# Patient Record
Sex: FEMALE | Race: WHITE | Employment: FULL TIME | URBAN - METROPOLITAN AREA
[De-identification: names, ages, dates, MRNs, and addresses within clinical notes are randomized per-mention and may not be internally consistent; named-entity substitution may affect disease eponyms.]

---

## 2020-10-10 ENCOUNTER — HOSPITAL ENCOUNTER (OUTPATIENT)
Age: 55
Discharge: ANOTHER HOSPITAL | End: 2020-10-10
Payer: COMMERCIAL

## 2020-10-10 VITALS
SYSTOLIC BLOOD PRESSURE: 159 MMHG | WEIGHT: 280 LBS | TEMPERATURE: 98 F | RESPIRATION RATE: 20 BRPM | OXYGEN SATURATION: 99 % | HEART RATE: 110 BPM | DIASTOLIC BLOOD PRESSURE: 79 MMHG

## 2020-10-10 DIAGNOSIS — R10.33 ABDOMINAL PAIN, PERIUMBILICAL: Primary | ICD-10-CM

## 2020-10-10 PROCEDURE — 99203 OFFICE O/P NEW LOW 30 MIN: CPT | Performed by: NURSE PRACTITIONER

## 2020-10-10 NOTE — ED INITIAL ASSESSMENT (HPI)
Pt having abdominal pain all day, and noticed a hernia to umbilicus. No fever/vomiting. Reports loose stool last night, no rash. Pt is visibly in pain, Having difficulty sitting down and having \"waves\" of pain. Painful to palpate umbilical area.  Pt immed

## 2020-10-10 NOTE — ED PROVIDER NOTES
Patient Seen in: [facility]    History   CC: abd pain  HPI: Donya Rader 50year old female  who presents c/o periumbilical abdominal pain which first began at approximately 1:00 AM however is been progressively worseni warm and dry throughout, mmm, no obvious signs of swelling/trauma/deformity, cap refill <2seconds  Neuro - A&O x4, steady gait  MSK - makes purposeful movements of all extremities, radial pulses 2+ bilat.   Psych - Interactive and appropriate      ED Course

## 2020-10-20 PROBLEM — D12.6 TUBULAR ADENOMA OF COLON: Status: ACTIVE | Noted: 2018-10-25

## 2020-10-20 PROBLEM — R59.0 LYMPHADENOPATHY OF LEFT CERVICAL REGION: Status: ACTIVE | Noted: 2019-05-06

## 2020-10-20 PROBLEM — E66.01 MORBID OBESITY (HCC): Status: ACTIVE | Noted: 2019-01-03

## 2020-11-19 ENCOUNTER — APPOINTMENT (OUTPATIENT)
Dept: LAB | Facility: HOSPITAL | Age: 55
End: 2020-11-19
Attending: SURGERY
Payer: COMMERCIAL

## 2020-11-19 PROCEDURE — 87641 MR-STAPH DNA AMP PROBE: CPT | Performed by: SURGERY

## 2020-11-20 ENCOUNTER — HOSPITAL ENCOUNTER (OUTPATIENT)
Age: 55
End: 2020-11-20
Attending: SURGERY | Admitting: SURGERY

## 2020-11-20 DIAGNOSIS — Z01.812 PRE-PROCEDURAL LABORATORY EXAMINATIONS: Primary | ICD-10-CM
